# Patient Record
Sex: FEMALE | Race: WHITE | NOT HISPANIC OR LATINO
[De-identification: names, ages, dates, MRNs, and addresses within clinical notes are randomized per-mention and may not be internally consistent; named-entity substitution may affect disease eponyms.]

---

## 2017-01-11 DIAGNOSIS — F41.1 ANXIETY STATE: Primary | ICD-10-CM

## 2017-01-12 RX ORDER — CLONAZEPAM 1 MG/1
TABLET ORAL
Qty: 30 TABLET | Refills: 0 | Status: SHIPPED | OUTPATIENT
Start: 2017-01-12 | End: 2017-03-31

## 2017-01-12 NOTE — TELEPHONE ENCOUNTER
Clonazepam      Last Written Prescription Date:  7/28/16  Last Fill Quantity: 30,   # refills: 1  Last Office Visit with [facility], [facility] or  Health prescribing provider: 5/24/16  Future Office visit:       Routing refill request to provider for review/approval because:  Drug not on the [facility], [facility] or  [employer] refill protocol or controlled substance    LIAM Rangel

## 2017-03-11 DIAGNOSIS — F41.1 ANXIETY STATE: ICD-10-CM

## 2017-03-11 NOTE — LETTER
[facility]  [address] [facility] [address]  [phone]      March 17, 2017      Italia Rubio  [address]   [place] [facility] [address]        Dear Italia,    We have tried to reach you by phone and have been unsuccessful.   We are concerned about your health care.  We recently provided you with medication refills.  Many medications require routine follow-up with your doctor.    Your prescription(s) FLUoxetine (PROZAC) 20 MG capsule has been refilled for 90 days so you may have time for the above noted follow-up. No further refills will be given until you are seen.  Please call scheduling at [phone] soon so we can assure you have an appointment before your next refills are needed.    Thank You

## 2017-03-13 NOTE — TELEPHONE ENCOUNTER
Last OV on 5/24/16 for dysuria.    Need fasting CPE appt with PCP.    Left message for patient to call clinic.  Please assist with appt.  Thank you    Sarah Lind RN

## 2017-03-13 NOTE — TELEPHONE ENCOUNTER
Fluoxetine     Last Written Prescription Date: 9/20/16  Last Fill Quantity: 90, # refills: 5  Last Office Visit with [facility] primary care provider:  5/24/16        Last PHQ-9 score on record=   PHQ-9 SCORE 8/19/2016   Total Score -   Total Score Kenneth 0   Total Score -             Juju Bryan CMA

## 2017-03-15 NOTE — TELEPHONE ENCOUNTER
Sent phq9/gabby via [employer]- last appointment says mental health referral provided- need to know if patient scheduled and if they are managing these medication now.    Maricarmen JaquezRN  [facility]  [phone]

## 2017-03-17 NOTE — TELEPHONE ENCOUNTER
3 rd attempt to reach patient   Left message on answering machine for patient to call back or check my chart    Routing to provider    Maricarmen Jaquez RN  [facility]  [phone]

## 2017-03-17 NOTE — TELEPHONE ENCOUNTER
1 month supply ordered. Please send her a letter to notify that she needs to come in for further refills.     Jd Acosta MD  [facility], [facility]

## 2017-03-31 DIAGNOSIS — F41.1 ANXIETY STATE: ICD-10-CM

## 2017-03-31 DIAGNOSIS — E03.9 HYPOTHYROIDISM: ICD-10-CM

## 2017-04-03 RX ORDER — LEVOTHYROXINE SODIUM 150 UG/1
TABLET ORAL
Qty: 30 TABLET | Refills: 0 | Status: SHIPPED | OUTPATIENT
Start: 2017-04-03 | End: 2017-04-29

## 2017-04-03 NOTE — TELEPHONE ENCOUNTER
synthroid     Last Written Prescription Date: 03/21/16  Last Quantity: 90, # refills: 3  Last Office Visit with [facility], [facility] or [facility] prescribing provider: 05/21/14        TSH   Date Value Ref Range Status   03/14/2016 1.27 0.40 - 4.00 mU/L Final     Left message on answering machine for patient to call back.  Due for labs and thyroid check.  Ok x 1 month  Maricarmen Jaquez RN  [facility]  [phone]

## 2017-04-03 NOTE — TELEPHONE ENCOUNTER
clonazepam      Last Written Prescription Date:  1/12/17  Last Fill Quantity: 30,   # refills: 0  Last Office Visit with [facility], [facility] or [facility] prescribing provider: 05/24/16  Future Office visit:       Routing refill request to provider for review/approval because:  Drug not on the [facility], [facility] or [facility] refill protocol or controlled substance    [place] reviewed:  reviewed- no concerns    [place]:  [email or web address]      Maricarmen Jaquez RN  [facility]  [phone]

## 2017-04-04 RX ORDER — CLONAZEPAM 1 MG/1
TABLET ORAL
Qty: 30 TABLET | Refills: 0 | Status: SHIPPED | OUTPATIENT
Start: 2017-04-04

## 2017-04-11 DIAGNOSIS — E78.5 HYPERLIPIDEMIA LDL GOAL <130: ICD-10-CM

## 2017-04-11 RX ORDER — ATORVASTATIN CALCIUM 20 MG/1
20 TABLET, FILM COATED ORAL DAILY
Qty: 30 TABLET | Refills: 0 | Status: SHIPPED | OUTPATIENT
Start: 2017-04-11

## 2017-04-11 NOTE — TELEPHONE ENCOUNTER
Atorvastatin      Last Written Prescription Date: 1/5/17  Last Fill Quantity: 90, # refills: 0  Last Office Visit with [facility] or [facility] prescribing provider: 5/24/16       Lab Results   Component Value Date    CHOL 234 03/14/2016     Lab Results   Component Value Date    HDL 88 03/14/2016     Lab Results   Component Value Date     03/14/2016     Lab Results   Component Value Date    TRIG 64 03/14/2016     Lab Results   Component Value Date    CHOLHDLRATIO 2.9 12/18/2008

## 2017-04-11 NOTE — TELEPHONE ENCOUNTER
Medication is being filled for 1 time refill only due to:  Patient needs labs and office visit.     In separate phone encounter for thyroid med, had message left to call and schedule OV and labs    LIAM Rangel

## 2017-04-29 DIAGNOSIS — E03.9 HYPOTHYROIDISM: ICD-10-CM

## 2017-05-01 NOTE — TELEPHONE ENCOUNTER
levothyroxine (SYNTHROID/LEVOTHROID) 150 MCG tablet     Last Written Prescription Date: 4/3/2017  Last Quantity: 30, # refills: 0  Last Office Visit with [facility], [facility] or [facility] prescribing provider: 5/24/2016        TSH   Date Value Ref Range Status   03/14/2016 1.27 0.40 - 4.00 mU/L Final

## 2017-05-01 NOTE — TELEPHONE ENCOUNTER
Left message on answering machine for patient to call back- needs appointment scheduled-    please schedule appointment prior to transferring to triage.      Maricarmen aJquez RN  [facility]  [phone]

## 2017-05-03 RX ORDER — LEVOTHYROXINE SODIUM 150 UG/1
TABLET ORAL
Qty: 30 TABLET | Refills: 0 | Status: SHIPPED | OUTPATIENT
Start: 2017-05-03

## 2017-05-03 NOTE — TELEPHONE ENCOUNTER
Left message on answering machine for patient to call back to schedule appointment for medication    30 day supply sent tot he pharmacy.  Maricarmen Jaquez RN  [facility]  [phone]

## 2017-06-01 RX ORDER — LEVOTHYROXINE SODIUM 150 UG/1
TABLET ORAL
Qty: 30 TABLET | Refills: 0 | OUTPATIENT
Start: 2017-06-01

## 2017-06-01 NOTE — LETTER
[facility]   [address] [place] [address]   [phone]      July 6, 2017    Italia Rubio  [address]   [place] [address]            Dear Ms. Rubio    Your physician requires an office visit in order to monitor your maintenance medication(s).  Your last office visit was on 5/24/16. Please call the clinic at [phone] to schedule an appointment..        Sincerely,    [facility]

## 2017-06-01 NOTE — TELEPHONE ENCOUNTER
Routing to team to inform and assist in scheduling.   Kiana Lehman RN   [facility] - Triage

## 2017-06-01 NOTE — TELEPHONE ENCOUNTER
Routing refill request to provider for review/approval because:  Sharee given x1 and patient did not follow up, please advise  Labs not current:  TSH  Patient needs to be seen because it has been more than 1 year since last office visit.    Geovanna De La Cruz RN  [facility] [employer]

## 2017-06-01 NOTE — TELEPHONE ENCOUNTER
Refill declined.   Patient needs to follow up. She is due for preventative visit as well.    Jd Acosta MD  [facility]

## 2017-06-01 NOTE — TELEPHONE ENCOUNTER
Levothyroxine     Last Written Prescription Date: 5/3/17  Last Quantity: 30, # refills: 0  Last Office Visit with [facility] or [facility] prescribing provider: 5/24/16        TSH   Date Value Ref Range Status   03/14/2016 1.27 0.40 - 4.00 mU/L Final

## 2017-06-17 ENCOUNTER — HEALTH MAINTENANCE LETTER (OUTPATIENT)
Age: 62
End: 2017-06-17

## 2017-06-29 NOTE — TELEPHONE ENCOUNTER
left voicemail message for patient to contact Main Clinic Number to schedule.  NTBS: Physical/medication check  Renetta MARIE

## 2018-06-23 ENCOUNTER — HEALTH MAINTENANCE LETTER (OUTPATIENT)
Age: 63
End: 2018-06-23

## 2019-10-01 ENCOUNTER — HEALTH MAINTENANCE LETTER (OUTPATIENT)
Age: 64
End: 2019-10-01

## 2020-03-22 ENCOUNTER — HEALTH MAINTENANCE LETTER (OUTPATIENT)
Age: 65
End: 2020-03-22

## 2021-01-15 ENCOUNTER — HEALTH MAINTENANCE LETTER (OUTPATIENT)
Age: 66
End: 2021-01-15

## 2021-09-04 ENCOUNTER — HEALTH MAINTENANCE LETTER (OUTPATIENT)
Age: 66
End: 2021-09-04

## 2022-02-19 ENCOUNTER — HEALTH MAINTENANCE LETTER (OUTPATIENT)
Age: 67
End: 2022-02-19

## 2022-10-16 ENCOUNTER — HEALTH MAINTENANCE LETTER (OUTPATIENT)
Age: 67
End: 2022-10-16

## 2023-04-01 ENCOUNTER — HEALTH MAINTENANCE LETTER (OUTPATIENT)
Age: 68
End: 2023-04-01